# Patient Record
Sex: FEMALE | Race: ASIAN | NOT HISPANIC OR LATINO | URBAN - METROPOLITAN AREA
[De-identification: names, ages, dates, MRNs, and addresses within clinical notes are randomized per-mention and may not be internally consistent; named-entity substitution may affect disease eponyms.]

---

## 2021-07-12 ENCOUNTER — APPOINTMENT (RX ONLY)
Dept: URBAN - METROPOLITAN AREA CLINIC 94 | Facility: CLINIC | Age: 51
Setting detail: DERMATOLOGY
End: 2021-07-12

## 2021-07-12 DIAGNOSIS — D485 NEOPLASM OF UNCERTAIN BEHAVIOR OF SKIN: ICD-10-CM | Status: INADEQUATELY CONTROLLED

## 2021-07-12 PROBLEM — D48.5 NEOPLASM OF UNCERTAIN BEHAVIOR OF SKIN: Status: ACTIVE | Noted: 2021-07-12

## 2021-07-12 PROCEDURE — 11102 TANGNTL BX SKIN SINGLE LES: CPT

## 2021-07-12 PROCEDURE — ? BIOPSY BY SHAVE METHOD

## 2021-07-12 PROCEDURE — 11103 TANGNTL BX SKIN EA SEP/ADDL: CPT

## 2021-07-12 ASSESSMENT — LOCATION DETAILED DESCRIPTION DERM
LOCATION DETAILED: LEFT MEDIAL PROXIMAL PRETIBIAL REGION
LOCATION DETAILED: LEFT ANTERIOR LATERAL DISTAL UPPER ARM
LOCATION DETAILED: LEFT LATERAL DISTAL PRETIBIAL REGION

## 2021-07-12 ASSESSMENT — LOCATION ZONE DERM
LOCATION ZONE: LEG
LOCATION ZONE: ARM

## 2021-07-12 ASSESSMENT — LOCATION SIMPLE DESCRIPTION DERM
LOCATION SIMPLE: LEFT PRETIBIAL REGION
LOCATION SIMPLE: LEFT UPPER ARM

## 2021-07-12 NOTE — PROCEDURE: BIOPSY BY SHAVE METHOD
Detail Level: Detailed
Depth Of Biopsy: dermis
Was A Bandage Applied: Yes
[Normal Sclera/Conjunctiva] : normal sclera/conjunctiva
Size Of Lesion In Cm: 0.6
[No JVD] : no jugular venous distention
X Size Of Lesion In Cm: 0
[Normal Outer Ear/Nose] : the outer ears and nose were normal in appearance
Biopsy Type: H and E
[No Edema] : there was no peripheral edema
Biopsy Method: Personna blade
[Normal] : normal rate, regular rhythm, normal S1 and S2 and no murmur heard
Anesthesia Type: 1% lidocaine with epinephrine
[Soft] : abdomen soft
Anesthesia Volume In Cc (Will Not Render If 0): 0.5
[Non Tender] : non-tender
Hemostasis: Drysol
[Normal Posterior Cervical Nodes] : no posterior cervical lymphadenopathy
Wound Care: Petrolatum
[Non-distended] : non-distended
Dressing: bandage
[No CVA Tenderness] : no CVA  tenderness
Destruction After The Procedure: No
[Normal Anterior Cervical Nodes] : no anterior cervical lymphadenopathy
Type Of Destruction Used: Curettage
[No Joint Swelling] : no joint swelling
Curettage Text: The wound bed was treated with curettage after the biopsy was performed.
[No Rash] : no rash
Cryotherapy Text: The wound bed was treated with cryotherapy after the biopsy was performed.
[Normal Gait] : normal gait
Electrodesiccation Text: The wound bed was treated with electrodesiccation after the biopsy was performed.
[Coordination Grossly Intact] : coordination grossly intact
Electrodesiccation And Curettage Text: The wound bed was treated with electrodesiccation and curettage after the biopsy was performed.
[Speech Grossly Normal] : speech grossly normal
Silver Nitrate Text: The wound bed was treated with silver nitrate after the biopsy was performed.
[Normal Affect] : the affect was normal
Lab: 6
[Alert and Oriented x3] : oriented to person, place, and time
Lab Facility: [id number]
[Normal Insight/Judgement] : insight and judgment were intact
Consent: Written consent was obtained and risks were reviewed including but not limited to scarring, infection, bleeding, scabbing, incomplete removal, nerve damage and allergy to anesthesia.
[PERRL] : pupils equal round and reactive to light
Post-Care Instructions: I reviewed with the patient in detail post-care instructions. Patient is to keep the biopsy site dry overnight, and then apply bacitracin twice daily until healed. Patient may apply hydrogen peroxide soaks to remove any crusting.
[EOMI] : extraocular movements intact
Notification Instructions: Patient will be notified of biopsy results. However, patient instructed to call the office if not contacted within 2 weeks.
[Normal Oropharynx] : the oropharynx was normal
Billing Type: Third-Party Bill
[Normal TMs] : both tympanic membranes were normal
[No Lymphadenopathy] : no lymphadenopathy
Information: Selecting Yes will display possible errors in your note based on the variables you have selected. This validation is only offered as a suggestion for you. PLEASE NOTE THAT THE VALIDATION TEXT WILL BE REMOVED WHEN YOU FINALIZE YOUR NOTE. IF YOU WANT TO FAX A PRELIMINARY NOTE YOU WILL NEED TO TOGGLE THIS TO 'NO' IF YOU DO NOT WANT IT IN YOUR FAXED NOTE.
[Supple] : supple
Size Of Lesion In Cm: 1
[Thyroid Normal, No Nodules] : the thyroid was normal and there were no nodules present
[No Masses] : no abdominal mass palpated
[Normal Bowel Sounds] : normal bowel sounds
[No Spinal Tenderness] : no spinal tenderness
[Grossly Normal Strength/Tone] : grossly normal strength/tone

## 2022-11-01 ENCOUNTER — APPOINTMENT (RX ONLY)
Dept: URBAN - METROPOLITAN AREA CLINIC 94 | Facility: CLINIC | Age: 52
Setting detail: DERMATOLOGY
End: 2022-11-01

## 2022-11-01 DIAGNOSIS — L259 CONTACT DERMATITIS AND OTHER ECZEMA, UNSPECIFIED CAUSE: ICD-10-CM | Status: INADEQUATELY CONTROLLED

## 2022-11-01 PROBLEM — L23.9 ALLERGIC CONTACT DERMATITIS, UNSPECIFIED CAUSE: Status: ACTIVE | Noted: 2022-11-01

## 2022-11-01 PROCEDURE — ? PRESCRIPTION

## 2022-11-01 PROCEDURE — ? COUNSELING

## 2022-11-01 PROCEDURE — ? PRESCRIPTION MEDICATION MANAGEMENT

## 2022-11-01 PROCEDURE — 99213 OFFICE O/P EST LOW 20 MIN: CPT

## 2022-11-01 PROCEDURE — ? PHOTO-DOCUMENTATION

## 2022-11-01 RX ORDER — HYDROCORTISONE 25 MG/G
CREAM TOPICAL BID
Qty: 20 | Refills: 0 | Status: ERX | COMMUNITY
Start: 2022-11-01

## 2022-11-01 RX ADMIN — HYDROCORTISONE: 25 CREAM TOPICAL at 00:00

## 2022-11-01 ASSESSMENT — LOCATION SIMPLE DESCRIPTION DERM
LOCATION SIMPLE: RIGHT EYEBROW
LOCATION SIMPLE: RIGHT FOREHEAD
LOCATION SIMPLE: RIGHT CHEEK
LOCATION SIMPLE: LEFT CHEEK

## 2022-11-01 ASSESSMENT — LOCATION ZONE DERM: LOCATION ZONE: FACE

## 2022-11-01 ASSESSMENT — LOCATION DETAILED DESCRIPTION DERM
LOCATION DETAILED: RIGHT SUPERIOR MEDIAL BUCCAL CHEEK
LOCATION DETAILED: LEFT CENTRAL BUCCAL CHEEK
LOCATION DETAILED: RIGHT CENTRAL EYEBROW
LOCATION DETAILED: RIGHT INFERIOR FOREHEAD

## 2022-11-01 NOTE — PROCEDURE: PRESCRIPTION MEDICATION MANAGEMENT
Initiate Treatment: Hydrocortisone 2.5% cream bid x 2 weeks
Render In Strict Bullet Format?: No
Detail Level: Zone

## 2023-01-10 ENCOUNTER — APPOINTMENT (RX ONLY)
Dept: URBAN - METROPOLITAN AREA CLINIC 94 | Facility: CLINIC | Age: 53
Setting detail: DERMATOLOGY
End: 2023-01-10

## 2023-01-10 DIAGNOSIS — L20.89 OTHER ATOPIC DERMATITIS: ICD-10-CM | Status: INADEQUATELY CONTROLLED

## 2023-01-10 PROBLEM — L20.84 INTRINSIC (ALLERGIC) ECZEMA: Status: ACTIVE | Noted: 2023-01-10

## 2023-01-10 PROCEDURE — ? ORDER TESTS

## 2023-01-10 PROCEDURE — ? PRESCRIPTION

## 2023-01-10 PROCEDURE — ? PHOTO-DOCUMENTATION

## 2023-01-10 PROCEDURE — 99214 OFFICE O/P EST MOD 30 MIN: CPT

## 2023-01-10 PROCEDURE — ? PRESCRIPTION MEDICATION MANAGEMENT

## 2023-01-10 PROCEDURE — ? COUNSELING

## 2023-01-10 RX ORDER — TRIAMCINOLONE ACETONIDE 1 MG/G
OINTMENT TOPICAL BID
Qty: 453.6 | Refills: 6 | Status: ERX | COMMUNITY
Start: 2023-01-10

## 2023-01-10 RX ADMIN — TRIAMCINOLONE ACETONIDE: 1 OINTMENT TOPICAL at 00:00

## 2023-01-10 ASSESSMENT — LOCATION DETAILED DESCRIPTION DERM
LOCATION DETAILED: RIGHT CENTRAL EYEBROW
LOCATION DETAILED: RIGHT SUPERIOR CENTRAL BUCCAL CHEEK
LOCATION DETAILED: RIGHT INFERIOR FOREHEAD
LOCATION DETAILED: LEFT LATERAL SUPERIOR EYELID
LOCATION DETAILED: LEFT CENTRAL BUCCAL CHEEK

## 2023-01-10 ASSESSMENT — LOCATION SIMPLE DESCRIPTION DERM
LOCATION SIMPLE: RIGHT EYEBROW
LOCATION SIMPLE: RIGHT FOREHEAD
LOCATION SIMPLE: LEFT CHEEK
LOCATION SIMPLE: LEFT SUPERIOR EYELID
LOCATION SIMPLE: RIGHT CHEEK

## 2023-01-10 ASSESSMENT — SEVERITY ASSESSMENT 2020: SEVERITY 2020: MODERATE

## 2023-01-10 ASSESSMENT — LOCATION ZONE DERM
LOCATION ZONE: EYELID
LOCATION ZONE: FACE

## 2023-01-10 NOTE — PROCEDURE: PRESCRIPTION MEDICATION MANAGEMENT
Discontinue Regimen: Hydrocortisone 2.5% topical cream
Render In Strict Bullet Format?: No
Detail Level: Zone

## 2023-01-10 NOTE — PROCEDURE: COUNSELING
Detail Level: Detailed
Antihistamine Recommendations: Xyzal once a day\\nBenadryl at bedtime as needed

## 2023-01-10 NOTE — PROCEDURE: ORDER TESTS
Bill For Surgical Tray: no
Performing Laboratory: 0
Expected Date Of Service: 01/10/2023
Billing Type: Third-Party Bill